# Patient Record
Sex: FEMALE | Race: WHITE | Employment: OTHER | URBAN - METROPOLITAN AREA
[De-identification: names, ages, dates, MRNs, and addresses within clinical notes are randomized per-mention and may not be internally consistent; named-entity substitution may affect disease eponyms.]

---

## 2019-12-02 ENCOUNTER — APPOINTMENT (OUTPATIENT)
Dept: GENERAL RADIOLOGY | Facility: CLINIC | Age: 74
End: 2019-12-02
Attending: EMERGENCY MEDICINE
Payer: COMMERCIAL

## 2019-12-02 ENCOUNTER — HOSPITAL ENCOUNTER (OUTPATIENT)
Facility: CLINIC | Age: 74
Setting detail: OBSERVATION
Discharge: HOME | End: 2019-12-03
Attending: EMERGENCY MEDICINE | Admitting: EMERGENCY MEDICINE
Payer: COMMERCIAL

## 2019-12-02 ENCOUNTER — APPOINTMENT (OUTPATIENT)
Dept: CT IMAGING | Facility: CLINIC | Age: 74
End: 2019-12-02
Attending: EMERGENCY MEDICINE
Payer: COMMERCIAL

## 2019-12-02 DIAGNOSIS — R55 SYNCOPE AND COLLAPSE: ICD-10-CM

## 2019-12-02 DIAGNOSIS — R00.1 BRADYCARDIA: ICD-10-CM

## 2019-12-02 DIAGNOSIS — E87.6 HYPOKALEMIA: ICD-10-CM

## 2019-12-02 DIAGNOSIS — R55 SYNCOPE, UNSPECIFIED SYNCOPE TYPE: ICD-10-CM

## 2019-12-02 LAB
ANION GAP SERPL CALCULATED.3IONS-SCNC: 11 MMOL/L (ref 3–14)
BASOPHILS # BLD AUTO: 0.1 10E9/L (ref 0–0.2)
BASOPHILS NFR BLD AUTO: 0.7 %
BUN SERPL-MCNC: 11 MG/DL (ref 7–30)
CALCIUM SERPL-MCNC: 8.5 MG/DL (ref 8.5–10.1)
CHLORIDE SERPL-SCNC: 98 MMOL/L (ref 94–109)
CO2 SERPL-SCNC: 23 MMOL/L (ref 20–32)
CREAT SERPL-MCNC: 1.06 MG/DL (ref 0.52–1.04)
DIFFERENTIAL METHOD BLD: NORMAL
EOSINOPHIL # BLD AUTO: 0.3 10E9/L (ref 0–0.7)
EOSINOPHIL NFR BLD AUTO: 3.6 %
ERYTHROCYTE [DISTWIDTH] IN BLOOD BY AUTOMATED COUNT: 12.8 % (ref 10–15)
GFR SERPL CREATININE-BSD FRML MDRD: 52 ML/MIN/{1.73_M2}
GLUCOSE SERPL-MCNC: 121 MG/DL (ref 70–99)
HCT VFR BLD AUTO: 38.6 % (ref 35–47)
HGB BLD-MCNC: 13.1 G/DL (ref 11.7–15.7)
IMM GRANULOCYTES # BLD: 0 10E9/L (ref 0–0.4)
IMM GRANULOCYTES NFR BLD: 0.3 %
LYMPHOCYTES # BLD AUTO: 3.4 10E9/L (ref 0.8–5.3)
LYMPHOCYTES NFR BLD AUTO: 44.5 %
MCH RBC QN AUTO: 31.1 PG (ref 26.5–33)
MCHC RBC AUTO-ENTMCNC: 33.9 G/DL (ref 31.5–36.5)
MCV RBC AUTO: 92 FL (ref 78–100)
MONOCYTES # BLD AUTO: 0.6 10E9/L (ref 0–1.3)
MONOCYTES NFR BLD AUTO: 7.6 %
NEUTROPHILS # BLD AUTO: 3.3 10E9/L (ref 1.6–8.3)
NEUTROPHILS NFR BLD AUTO: 43.3 %
NRBC # BLD AUTO: 0 10*3/UL
NRBC BLD AUTO-RTO: 0 /100
NT-PROBNP SERPL-MCNC: 80 PG/ML (ref 0–900)
PLATELET # BLD AUTO: 224 10E9/L (ref 150–450)
POTASSIUM SERPL-SCNC: 2.8 MMOL/L (ref 3.4–5.3)
RBC # BLD AUTO: 4.21 10E12/L (ref 3.8–5.2)
SODIUM SERPL-SCNC: 132 MMOL/L (ref 133–144)
TROPONIN I BLD-MCNC: 0 UG/L (ref 0–0.08)
TROPONIN I SERPL-MCNC: <0.015 UG/L (ref 0–0.04)
WBC # BLD AUTO: 7.6 10E9/L (ref 4–11)

## 2019-12-02 PROCEDURE — 93010 ELECTROCARDIOGRAM REPORT: CPT | Mod: Z6 | Performed by: EMERGENCY MEDICINE

## 2019-12-02 PROCEDURE — 85025 COMPLETE CBC W/AUTO DIFF WBC: CPT | Performed by: EMERGENCY MEDICINE

## 2019-12-02 PROCEDURE — 93010 ELECTROCARDIOGRAM REPORT: CPT | Mod: 76 | Performed by: EMERGENCY MEDICINE

## 2019-12-02 PROCEDURE — 99285 EMERGENCY DEPT VISIT HI MDM: CPT | Mod: 25 | Performed by: EMERGENCY MEDICINE

## 2019-12-02 PROCEDURE — 83880 ASSAY OF NATRIURETIC PEPTIDE: CPT | Performed by: EMERGENCY MEDICINE

## 2019-12-02 PROCEDURE — 96374 THER/PROPH/DIAG INJ IV PUSH: CPT | Performed by: EMERGENCY MEDICINE

## 2019-12-02 PROCEDURE — 80320 DRUG SCREEN QUANTALCOHOLS: CPT | Performed by: EMERGENCY MEDICINE

## 2019-12-02 PROCEDURE — 71045 X-RAY EXAM CHEST 1 VIEW: CPT

## 2019-12-02 PROCEDURE — 93005 ELECTROCARDIOGRAM TRACING: CPT | Performed by: EMERGENCY MEDICINE

## 2019-12-02 PROCEDURE — 25000128 H RX IP 250 OP 636: Performed by: EMERGENCY MEDICINE

## 2019-12-02 PROCEDURE — 25800030 ZZH RX IP 258 OP 636: Performed by: EMERGENCY MEDICINE

## 2019-12-02 PROCEDURE — 84484 ASSAY OF TROPONIN QUANT: CPT | Performed by: EMERGENCY MEDICINE

## 2019-12-02 PROCEDURE — 93005 ELECTROCARDIOGRAM TRACING: CPT | Mod: 76 | Performed by: EMERGENCY MEDICINE

## 2019-12-02 PROCEDURE — 0298T ZZC EXT ECG > 48HR TO 21 DAY REVIEW AND INTERPRETATN: CPT | Performed by: INTERNAL MEDICINE

## 2019-12-02 PROCEDURE — 70450 CT HEAD/BRAIN W/O DYE: CPT

## 2019-12-02 PROCEDURE — 80048 BASIC METABOLIC PNL TOTAL CA: CPT | Performed by: EMERGENCY MEDICINE

## 2019-12-02 PROCEDURE — 25000132 ZZH RX MED GY IP 250 OP 250 PS 637: Performed by: EMERGENCY MEDICINE

## 2019-12-02 PROCEDURE — 84484 ASSAY OF TROPONIN QUANT: CPT

## 2019-12-02 PROCEDURE — 96361 HYDRATE IV INFUSION ADD-ON: CPT | Performed by: EMERGENCY MEDICINE

## 2019-12-02 RX ORDER — ATORVASTATIN CALCIUM 40 MG/1
40 TABLET, FILM COATED ORAL DAILY
COMMUNITY

## 2019-12-02 RX ORDER — ATENOLOL 50 MG/1
50 TABLET ORAL DAILY
COMMUNITY
End: 2019-12-03

## 2019-12-02 RX ORDER — LEVOTHYROXINE SODIUM 100 UG/1
100 TABLET ORAL DAILY
Status: ON HOLD | COMMUNITY
End: 2019-12-03

## 2019-12-02 RX ORDER — LISINOPRIL AND HYDROCHLOROTHIAZIDE 12.5; 2 MG/1; MG/1
1 TABLET ORAL DAILY
COMMUNITY

## 2019-12-02 RX ORDER — ONDANSETRON 2 MG/ML
4 INJECTION INTRAMUSCULAR; INTRAVENOUS ONCE
Status: COMPLETED | OUTPATIENT
Start: 2019-12-02 | End: 2019-12-02

## 2019-12-02 RX ORDER — AMLODIPINE BESYLATE 5 MG/1
5 TABLET ORAL DAILY
COMMUNITY

## 2019-12-02 RX ORDER — ASPIRIN 81 MG/1
81 TABLET ORAL DAILY
COMMUNITY

## 2019-12-02 RX ORDER — POTASSIUM CHLORIDE 750 MG/1
40 TABLET, EXTENDED RELEASE ORAL ONCE
Status: COMPLETED | OUTPATIENT
Start: 2019-12-02 | End: 2019-12-02

## 2019-12-02 RX ADMIN — POTASSIUM CHLORIDE 40 MEQ: 750 TABLET, EXTENDED RELEASE ORAL at 21:54

## 2019-12-02 RX ADMIN — SODIUM CHLORIDE 1000 ML: 900 INJECTION, SOLUTION INTRAVENOUS at 21:00

## 2019-12-02 RX ADMIN — ONDANSETRON 4 MG: 2 INJECTION INTRAMUSCULAR; INTRAVENOUS at 20:49

## 2019-12-02 ASSESSMENT — ENCOUNTER SYMPTOMS
NAUSEA: 1
FEVER: 0
VOMITING: 0
BACK PAIN: 0
HEADACHES: 0
NECK PAIN: 0

## 2019-12-02 ASSESSMENT — MIFFLIN-ST. JEOR: SCORE: 1199.44

## 2019-12-03 ENCOUNTER — APPOINTMENT (OUTPATIENT)
Dept: CARDIOLOGY | Facility: CLINIC | Age: 74
End: 2019-12-03
Attending: NURSE PRACTITIONER
Payer: COMMERCIAL

## 2019-12-03 VITALS
DIASTOLIC BLOOD PRESSURE: 78 MMHG | RESPIRATION RATE: 16 BRPM | HEART RATE: 67 BPM | OXYGEN SATURATION: 98 % | WEIGHT: 155.3 LBS | TEMPERATURE: 98.3 F | SYSTOLIC BLOOD PRESSURE: 145 MMHG | BODY MASS INDEX: 26.51 KG/M2 | HEIGHT: 64 IN

## 2019-12-03 PROBLEM — R55 SYNCOPE: Status: ACTIVE | Noted: 2019-12-03

## 2019-12-03 LAB
ALBUMIN SERPL-MCNC: 3.3 G/DL (ref 3.4–5)
ALP SERPL-CCNC: 83 U/L (ref 40–150)
ALT SERPL W P-5'-P-CCNC: 16 U/L (ref 0–50)
ANION GAP SERPL CALCULATED.3IONS-SCNC: 5 MMOL/L (ref 3–14)
AST SERPL W P-5'-P-CCNC: 19 U/L (ref 0–45)
BILIRUB SERPL-MCNC: 0.8 MG/DL (ref 0.2–1.3)
BUN SERPL-MCNC: 9 MG/DL (ref 7–30)
CALCIUM SERPL-MCNC: 8.3 MG/DL (ref 8.5–10.1)
CHLORIDE SERPL-SCNC: 103 MMOL/L (ref 94–109)
CO2 SERPL-SCNC: 25 MMOL/L (ref 20–32)
CREAT SERPL-MCNC: 0.82 MG/DL (ref 0.52–1.04)
ERYTHROCYTE [DISTWIDTH] IN BLOOD BY AUTOMATED COUNT: 12.7 % (ref 10–15)
ETHANOL SERPL-MCNC: 0.02 G/DL
GFR SERPL CREATININE-BSD FRML MDRD: 71 ML/MIN/{1.73_M2}
GLUCOSE SERPL-MCNC: 89 MG/DL (ref 70–99)
HCT VFR BLD AUTO: 36.4 % (ref 35–47)
HGB BLD-MCNC: 12 G/DL (ref 11.7–15.7)
INTERPRETATION ECG - MUSE: NORMAL
MAGNESIUM SERPL-MCNC: 1.7 MG/DL (ref 1.6–2.3)
MCH RBC QN AUTO: 30.5 PG (ref 26.5–33)
MCHC RBC AUTO-ENTMCNC: 33 G/DL (ref 31.5–36.5)
MCV RBC AUTO: 92 FL (ref 78–100)
PLATELET # BLD AUTO: 194 10E9/L (ref 150–450)
POTASSIUM SERPL-SCNC: 4.2 MMOL/L (ref 3.4–5.3)
PROT SERPL-MCNC: 5.9 G/DL (ref 6.8–8.8)
RBC # BLD AUTO: 3.94 10E12/L (ref 3.8–5.2)
SODIUM SERPL-SCNC: 134 MMOL/L (ref 133–144)
TROPONIN I SERPL-MCNC: <0.015 UG/L (ref 0–0.04)
TROPONIN I SERPL-MCNC: <0.015 UG/L (ref 0–0.04)
WBC # BLD AUTO: 6.2 10E9/L (ref 4–11)

## 2019-12-03 PROCEDURE — 83735 ASSAY OF MAGNESIUM: CPT | Performed by: NURSE PRACTITIONER

## 2019-12-03 PROCEDURE — 80053 COMPREHEN METABOLIC PANEL: CPT | Performed by: NURSE PRACTITIONER

## 2019-12-03 PROCEDURE — 93005 ELECTROCARDIOGRAM TRACING: CPT

## 2019-12-03 PROCEDURE — 93306 TTE W/DOPPLER COMPLETE: CPT | Mod: 26 | Performed by: INTERNAL MEDICINE

## 2019-12-03 PROCEDURE — 25800030 ZZH RX IP 258 OP 636: Performed by: NURSE PRACTITIONER

## 2019-12-03 PROCEDURE — 93010 ELECTROCARDIOGRAM REPORT: CPT | Performed by: INTERNAL MEDICINE

## 2019-12-03 PROCEDURE — 40000264 ECHOCARDIOGRAM COMPLETE

## 2019-12-03 PROCEDURE — 96361 HYDRATE IV INFUSION ADD-ON: CPT

## 2019-12-03 PROCEDURE — 99235 HOSP IP/OBS SAME DATE MOD 70: CPT | Mod: Z6 | Performed by: EMERGENCY MEDICINE

## 2019-12-03 PROCEDURE — 84484 ASSAY OF TROPONIN QUANT: CPT | Performed by: NURSE PRACTITIONER

## 2019-12-03 PROCEDURE — 25000132 ZZH RX MED GY IP 250 OP 250 PS 637: Performed by: NURSE PRACTITIONER

## 2019-12-03 PROCEDURE — G0378 HOSPITAL OBSERVATION PER HR: HCPCS

## 2019-12-03 PROCEDURE — 85027 COMPLETE CBC AUTOMATED: CPT | Performed by: NURSE PRACTITIONER

## 2019-12-03 PROCEDURE — 36415 COLL VENOUS BLD VENIPUNCTURE: CPT | Performed by: NURSE PRACTITIONER

## 2019-12-03 RX ORDER — ONDANSETRON 4 MG/1
4 TABLET, ORALLY DISINTEGRATING ORAL EVERY 6 HOURS PRN
Status: DISCONTINUED | OUTPATIENT
Start: 2019-12-03 | End: 2019-12-03 | Stop reason: HOSPADM

## 2019-12-03 RX ORDER — POTASSIUM CL/LIDO/0.9 % NACL 10MEQ/0.1L
10 INTRAVENOUS SOLUTION, PIGGYBACK (ML) INTRAVENOUS
Status: DISCONTINUED | OUTPATIENT
Start: 2019-12-03 | End: 2019-12-03 | Stop reason: HOSPADM

## 2019-12-03 RX ORDER — LEVOTHYROXINE SODIUM 112 UG/1
112 TABLET ORAL DAILY
COMMUNITY

## 2019-12-03 RX ORDER — LEVOTHYROXINE SODIUM 112 UG/1
112 TABLET ORAL
Status: DISCONTINUED | OUTPATIENT
Start: 2019-12-03 | End: 2019-12-03 | Stop reason: HOSPADM

## 2019-12-03 RX ORDER — ONDANSETRON 2 MG/ML
4 INJECTION INTRAMUSCULAR; INTRAVENOUS EVERY 6 HOURS PRN
Status: DISCONTINUED | OUTPATIENT
Start: 2019-12-03 | End: 2019-12-03 | Stop reason: HOSPADM

## 2019-12-03 RX ORDER — POTASSIUM CHLORIDE 1.5 G/1.58G
20-40 POWDER, FOR SOLUTION ORAL
Status: DISCONTINUED | OUTPATIENT
Start: 2019-12-03 | End: 2019-12-03 | Stop reason: HOSPADM

## 2019-12-03 RX ORDER — ASPIRIN 81 MG/1
81 TABLET ORAL DAILY
Status: DISCONTINUED | OUTPATIENT
Start: 2019-12-03 | End: 2019-12-03 | Stop reason: HOSPADM

## 2019-12-03 RX ORDER — SODIUM CHLORIDE 9 MG/ML
INJECTION, SOLUTION INTRAVENOUS CONTINUOUS
Status: DISCONTINUED | OUTPATIENT
Start: 2019-12-03 | End: 2019-12-03 | Stop reason: HOSPADM

## 2019-12-03 RX ORDER — LISINOPRIL AND HYDROCHLOROTHIAZIDE 12.5; 2 MG/1; MG/1
1 TABLET ORAL DAILY
Status: DISCONTINUED | OUTPATIENT
Start: 2019-12-03 | End: 2019-12-03 | Stop reason: HOSPADM

## 2019-12-03 RX ORDER — POTASSIUM CHLORIDE 750 MG/1
40 TABLET, EXTENDED RELEASE ORAL ONCE
Status: COMPLETED | OUTPATIENT
Start: 2019-12-03 | End: 2019-12-03

## 2019-12-03 RX ORDER — LIDOCAINE 40 MG/G
CREAM TOPICAL
Status: DISCONTINUED | OUTPATIENT
Start: 2019-12-03 | End: 2019-12-03 | Stop reason: HOSPADM

## 2019-12-03 RX ORDER — NITROGLYCERIN 0.4 MG/1
0.4 TABLET SUBLINGUAL EVERY 5 MIN PRN
Status: DISCONTINUED | OUTPATIENT
Start: 2019-12-03 | End: 2019-12-03 | Stop reason: HOSPADM

## 2019-12-03 RX ORDER — ACETAMINOPHEN 650 MG/1
650 SUPPOSITORY RECTAL EVERY 4 HOURS PRN
Status: DISCONTINUED | OUTPATIENT
Start: 2019-12-03 | End: 2019-12-03 | Stop reason: HOSPADM

## 2019-12-03 RX ORDER — ACETAMINOPHEN 325 MG/1
650 TABLET ORAL EVERY 4 HOURS PRN
Status: DISCONTINUED | OUTPATIENT
Start: 2019-12-03 | End: 2019-12-03 | Stop reason: HOSPADM

## 2019-12-03 RX ORDER — POTASSIUM CHLORIDE 29.8 MG/ML
20 INJECTION INTRAVENOUS
Status: DISCONTINUED | OUTPATIENT
Start: 2019-12-03 | End: 2019-12-03 | Stop reason: HOSPADM

## 2019-12-03 RX ORDER — ATORVASTATIN CALCIUM 40 MG/1
40 TABLET, FILM COATED ORAL DAILY
Status: DISCONTINUED | OUTPATIENT
Start: 2019-12-03 | End: 2019-12-03 | Stop reason: HOSPADM

## 2019-12-03 RX ORDER — AMLODIPINE BESYLATE 5 MG/1
5 TABLET ORAL EVERY EVENING
Status: DISCONTINUED | OUTPATIENT
Start: 2019-12-03 | End: 2019-12-03 | Stop reason: HOSPADM

## 2019-12-03 RX ORDER — POTASSIUM CHLORIDE 750 MG/1
20-40 TABLET, EXTENDED RELEASE ORAL
Status: DISCONTINUED | OUTPATIENT
Start: 2019-12-03 | End: 2019-12-03 | Stop reason: HOSPADM

## 2019-12-03 RX ORDER — POTASSIUM CHLORIDE 7.45 MG/ML
10 INJECTION INTRAVENOUS
Status: DISCONTINUED | OUTPATIENT
Start: 2019-12-03 | End: 2019-12-03 | Stop reason: HOSPADM

## 2019-12-03 RX ADMIN — POTASSIUM CHLORIDE 40 MEQ: 750 TABLET, EXTENDED RELEASE ORAL at 01:10

## 2019-12-03 RX ADMIN — LISINOPRIL AND HYDROCHLOROTHIAZIDE 1 TABLET: 20; 12.5 TABLET ORAL at 07:46

## 2019-12-03 RX ADMIN — ASPIRIN 81 MG: 81 TABLET, COATED ORAL at 07:46

## 2019-12-03 RX ADMIN — ATORVASTATIN CALCIUM 40 MG: 40 TABLET, FILM COATED ORAL at 07:46

## 2019-12-03 RX ADMIN — LEVOTHYROXINE SODIUM 112 MCG: 112 TABLET ORAL at 07:46

## 2019-12-03 RX ADMIN — SODIUM CHLORIDE: 9 INJECTION, SOLUTION INTRAVENOUS at 01:34

## 2019-12-03 RX ADMIN — SODIUM CHLORIDE: 9 INJECTION, SOLUTION INTRAVENOUS at 09:16

## 2019-12-03 NOTE — PLAN OF CARE
Outpatient/Observation goals to be met before discharge home:  - Diagnostic tests and consults completed and resulted- Not Met  - No further episodes of syncope and any new arrhythmia addressed with controlled heart rate-Met   - Vital signs normal or at patient baseline and orthostatic vitals are normal and patient not lightheaded with standing-Met  - Tolerating oral intake to maintain hydration- Met  - Safe disposition plan has been identified-Not Met     *Nurse to notify provider when observation goals have been met and patient is ready for discharge.

## 2019-12-03 NOTE — H&P
[facility], [place]    History and Physical - ED Observation        Date of Admission:  12/2/2019    Assessment & Plan   Joycelyn Chan is a 74 year old female with a history of HTN and HPL who presents to the Emergency Department today for evaluation following a syncopal episode.    1. Syncope: The pt had several alcoholic beverages at dinner. She states she started to feel unwell and got up to go outside to get some air. While on her way outside, pt had syncopal episode and fell to the ground. It's unclear from witnesses whether pt struck her head. Per EMS, pt was hypotensive to 70 systolic with no heart rate. Pt denies neck pain, back pain, or headache. Her only complaint is nausea. She denies fever, chest pain, or shortness of breath. In the ED, /75, HR 59, temp 97.4, RR 12, SPO2 98%. Labs show: Na 132, K+ 2.8, Cr 1.06, BUN 11. BNP 80. Troponin negative. Glucose 121. WBC 7.6, Hgb 13.1, hematocrit 38.6. Chest xray shows Blunting of left costophrenic angle. Focal atelectasis left base. Right lung clear. Heart size normal. CT Head negative for bleed or other acute process. Cardiology fellow, Saumya, was contacted regarding EKG abnormalities and confirm that EKG does not show STEMI or abnormalities in CT interval. Medications: 1000ml IVF bolus, Zofran 4mg IV. Plan: Admit to ED Observation under Syncope protocol.   -[place] to ED Obs  -Continuous cardiac monitoring  -Serial troponins  -Ethanol level pending  -Echocardiogram pending  -IVF  -Orthostatics pending  -CBC, CMP in AM    2. Hypokalemia: K+2.8  -Replace with another 40mEq potassium tonight  -CMP in AM    Chronic Medical Problems  #Hypothyroid: continue PTA levothyroxine 112 mcg   #HTN: continue PTA amlodipine, continue PTA ASA 81 mg, continue PTA lisinopril-hydrochlorothiazide  #HLD: continue PTA atorvastatin  #GERD: continue PTA prilosec 20mg daily     Diet: regular, caffeine free  DVT Prophylaxis: Low Risk/Ambulatory with  "no VTE prophylaxis indicated  Alvarez Catheter: not present  Code Status: full    Disposition Plan   Expected discharge: Tomorrow, recommended to prior living arrangement once syncope workup complete.  Entered: Ania Mcmahan CNP 12/03/2019, 12:37 AM     The patient's care was discussed with the Attending Physician, Dr. Vincent.    Ania Mcmahan CNP  [facility], [place]  ED Observation, 6D  Ascom:[id number]  ______________________________________________________________________    Chief Complaint   Loss of consciousness, fall    History is obtained from the patient    History of Present Illness   Per ED,\"Joycelyn Chan is a 74 year old female with a history of HTN and HPL who presents to the Emergency Department today for evaluation following a syncopal episode. The patient was at [facility] where she had a few alcoholic beverages. She states she started to feel unwell and got up to go outside to get some air. While on her way outside the patient had a syncopal episode and fell to the ground. It is unclear from witnesses whether or not the patient struck her head in the process of the fall. Upon arrival of EMS to the scene, the patient had a systolic blood pressure of 70 and they were unable to read a heart rate. Since the fall, the patient has denies neck pain, back pain, or headache. Her only complaint is nausea. She denies fever, chest pain, or shortness of breath. The patient reports that she has never had a syncopal episode in the past. The patient does have a family history of heart issues. \"    Review of Systems    Constitutional: Negative for fever.   Gastrointestinal: Positive for nausea. Negative for vomiting.   Musculoskeletal: Negative for back pain and neck pain.   Neurological: Positive for syncope. Negative for headaches.   All other systems reviewed and are negative.    Past Medical History    I have reviewed this patient's medical history and updated it with pertinent " information if needed.   Past Medical History:   Diagnosis Date     GERD (gastroesophageal reflux disease)      Hypercholesteremia      Hypertension      Thyroid disease        Past Surgical History   I have reviewed this patient's surgical history and updated it with pertinent information if needed.  Past Surgical History:   Procedure Laterality Date     ARTHROSCOPY SHOULDER ROTATOR CUFF REPAIR Left      breast reduction       KNEE SURGERY Right     acl repair       Social History   I have reviewed this patient's social history and updated it with pertinent information if needed.  Social History     Tobacco Use     Smoking status: Former Smoker     Smokeless tobacco: Never Used   Substance Use Topics     Alcohol use: Yes     Drug use: Not Currently       Family History   I have reviewed this patient's family history and updated it with pertinent information if needed.   No family history on file.    Prior to Admission Medications   Prior to Admission Medications   Prescriptions Last Dose Informant Patient Reported? Taking?   VITAMIN D PO   Yes Yes   Sig: Take 1,000 mg by mouth   amLODIPine (NORVASC) 5 MG tablet   Yes Yes   Sig: Take 5 mg by mouth daily   aspirin 81 MG EC tablet   Yes Yes   Sig: Take 81 mg by mouth daily   atorvastatin (LIPITOR) 40 MG tablet   Yes Yes   Sig: Take 40 mg by mouth daily   levothyroxine (SYNTHROID/LEVOTHROID) 100 MCG tablet   Yes Yes   Sig: Take 100 mcg by mouth daily   lisinopril-hydrochlorothiazide (PRINZIDE/ZESTORETIC) 20-12.5 MG tablet   Yes Yes   Sig: Take 1 tablet by mouth daily      Facility-Administered Medications: None     Allergies   No Known Allergies    Physical Exam   Vital Signs: Temp: 97.4  F (36.3  C) Temp src: Oral BP: 130/75 Pulse: 67 Heart Rate: 60 Resp: 17 SpO2: 96 % O2 Device: None (Room air)    Weight: 155 lbs 4.8 oz    Constitutional: awake, alert, cooperative, no apparent distress, and appears stated age  Eyes: Lids and lashes normal, pupils equal, round and  reactive to light, extra ocular muscles intact, sclera clear, conjunctiva normal  ENT: Normocephalic, atraumatic, external ears without lesions, oral pharynx with moist mucous membranes, gums normal and good dentition.  Respiratory: Clear to auscultation bilaterally, no crackles or wheezing  Cardiovascular: Regular rate and rhythm, normal S1 and S2, and no murmur noted  Abdomen: Normal bowel sounds, soft, non-distended, non-tender  Skin: Normal skin color, texture, turgor  Musculoskeletal: There is no redness, warmth, or swelling of the joints.  Full range of motion noted.  Motor strength is 5 out of 5 all extremities bilaterally.  Tone is normal.  Neurologic: Awake, alert, oriented to name, place and time.  Cranial nerves II-XII are grossly intact.  Motor is 5 out of 5 bilaterally.  Cerebellar finger to nose, heel to shin intact.  Gait is normal.    Data   Data reviewed today: I reviewed all medications, new labs and imaging results over the last 24 hours. I personally reviewed the CT and xray image(s) showing :    EKG  Interpreted by Jabier Marin MD  Time reviewed: 2041  Symptoms at time of EKG: syncope  Rhythm: normal sinus   Rate: 59  Axis: normal  Ectopy: none  Conduction: normal  ST Segments/ T Waves: t wave inversions anteriorly V2 V3  Q Waves: none  Comparison to prior: No old EKG available     Clinical Impression: Sinus bradycardia with anterior t wave inversions    EKG II  Interpreted by Jabier Marin MD  Time reviewed:2117  Symptoms at time of EKG: syncope  Rhythm: sinus bradycardia  Rate: 56  Axis: NORMAL  Ectopy: none  Conduction: normal  ST Segments/ T Waves: anterior t wave inversions V2 V3  Q Waves: none  Comparison to prior: unchanged     Clinical Impression: NSR with anterior t wave inversions    Recent Labs   Lab 12/02/19 2037 12/02/19 2035   WBC 7.6  --    HGB 13.1  --    MCV 92  --      --    *  --    POTASSIUM 2.8*  --    CHLORIDE 98  --    CO2 23  --    BUN 11   --    CR 1.06*  --    ANIONGAP 11  --    ANGELA 8.5  --    *  --    TROPI <0.015  --    TROPONIN  --  0.00     Recent Results (from the past 24 hour(s))   XR Chest Port 1 View    Narrative    EXAM: XR CHEST PORT 1 VW  LOCATION: [facility]  DATE/TIME: 12/2/2019 8:41 PM    INDICATION: Syncopal episode  COMPARISON: None.      Impression    IMPRESSION: Blunting of left costophrenic angle. Focal atelectasis left base. Right lung clear. Heart size normal   Head CT w/o contrast    Narrative    EXAM: CT HEAD W/O CONTRAST  LOCATION: [facility]  DATE/TIME: 12/2/2019 10:01 PM    INDICATION: Altered level of consciousness (LOC), unexplained  See the Clinical Information for Interpreting Provider  COMPARISON: None.  TECHNIQUE: Routine without IV contrast. Multiplanar reformats. Dose reduction techniques were used.    FINDINGS:  INTRACRANIAL CONTENTS: No intracranial hemorrhage, extraaxial collection, or mass effect.  No CT evidence of acute infarct. Normal parenchymal attenuation. Normal ventricles and sulci.     VISUALIZED ORBITS/SINUSES/MASTOIDS: No intraorbital abnormality. No paranasal sinus mucosal disease. No middle ear or mastoid effusion.    BONES/SOFT TISSUES: No acute abnormality.      Impression    IMPRESSION:  1.  Normal head CT.

## 2019-12-03 NOTE — PLAN OF CARE
Outpatient/Observation goals to be met before discharge home:      - Diagnostic tests and consults completed and resulted - Yes  - No further episodes of syncope and any new arrhythmia addressed with controlled heart rates - Yes  - Vital signs normal or at patient baseline and orthostatic vitals are normal and patient not lightheaded with standing - Yes  - Tolerating oral intake to maintain hydration - Yes  - Safe disposition plan has been identified - Yes

## 2019-12-03 NOTE — ED PROVIDER NOTES
History     Chief Complaint   Patient presents with     Loss of Consciousness     Fall     Nausea     HPI  Joycelyn Chan is a 74 year old female with a history of HTN and HPL who presents to the Emergency Department today for evaluation following a syncopal episode. The patient was at [facility] where she had a few alcoholic beverages. She states she started to feel unwell and got up to go outside to get some air. While on her way outside the patient had a syncopal episode and fell to the ground. It is unclear from witnesses whether or not the patient struck her head in the process of the fall. Upon arrival of EMS to the scene, the patient had a systolic blood pressure of 70 and they were unable to read a heart rate. Since the fall, the patient has denies neck pain, back pain, or headache. Her only complaint is nausea. She denies fever, chest pain, or shortness of breath. The patient reports that she has never had a syncopal episode in the past. The patient does have a family history of heart issues.     I have reviewed the Medications, Allergies, Past Medical and Surgical History, and Social History in the [employer] system.    Past Medical History:   Diagnosis Date     GERD (gastroesophageal reflux disease)      Hypercholesteremia      Hypertension      Thyroid disease      No past surgical history on file.    No family history on file.    Social History     Tobacco Use     Smoking status: Former Smoker     Smokeless tobacco: Never Used   Substance Use Topics     Alcohol use: Yes     Current Facility-Administered Medications   Medication     0.9% sodium chloride BOLUS     ondansetron (ZOFRAN) injection 4 mg     No current outpatient medications on file.      No Known Allergies     Review of Systems   Constitutional: Negative for fever.   Gastrointestinal: Positive for nausea. Negative for vomiting.   Musculoskeletal: Negative for back pain and neck pain.   Neurological: Positive for syncope. Negative for  headaches.   All other systems reviewed and are negative.    Physical Exam      Physical Exam  Vitals signs and nursing note reviewed.   Constitutional:       General: She is not in acute distress.     Appearance: She is not diaphoretic.   HENT:      Head: Normocephalic and atraumatic.   Eyes:      Conjunctiva/sclera: Conjunctivae normal.      Pupils: Pupils are equal, round, and reactive to light.   Neck:      Musculoskeletal: Normal range of motion and neck supple.   Cardiovascular:      Rate and Rhythm: Normal rate and regular rhythm.   Pulmonary:      Effort: Pulmonary effort is normal. No respiratory distress.   Abdominal:      General: There is no distension.      Palpations: Abdomen is soft.      Tenderness: There is no abdominal tenderness. There is no guarding.   Musculoskeletal: Normal range of motion.   Skin:     General: Skin is warm and dry.   Neurological:      Mental Status: She is alert and oriented to person, place, and time.   Psychiatric:         Behavior: Behavior normal.         Thought Content: Thought content normal.         ED Course   8:30 PM  The patient was seen and examined by Dr. Marin in Room 10.       Procedures             EKG Interpretation:      Interpreted by Jabier Marin MD  Time reviewed: 2041  Symptoms at time of EKG: syncope  Rhythm: normal sinus   Rate: 59  Axis: normal  Ectopy: none  Conduction: normal  ST Segments/ T Waves: t wave inversions anteriorly V2 V3  Q Waves: none  Comparison to prior: No old EKG available    Clinical Impression: Sinus bradycardia with anterior t wave inversions         EKG Interpretation:      Interpreted by Jabier Marin MD  Time reviewed:2117  Symptoms at time of EKG: syncope  Rhythm: sinus bradycardia  Rate: 56  Axis: NORMAL  Ectopy: none  Conduction: normal  ST Segments/ T Waves: anterior t wave inversions V2 V3  Q Waves: none  Comparison to prior: unchanged    Clinical Impression: NSR with anterior t wave  inversions          Critical Care time:  none             Labs Ordered and Resulted from Time of ED Arrival Up to the Time of Departure from the ED - No data to display         Assessments & Plan (with Medical Decision Making)   This is a 73 yo F who presents with a syncopal event while dining out.  She felt nauseated and hot prior to her syncope and states she did not eat much today.  EKG with Sinus Bradycardia, rate of 59 and anterior t wave inversions, which was unchanged with repeat EKG.  Troponin negative.  Potassium was 2.8 and Kdur 40mEq given.  Labs otherwise unremarkable.  Joycelyn was given a NS bolus and Zofran for nausea.  Likely vasovagal syncope.  Will admit to observation for serial troponins and an echo.  No evidence of ACS or other serious pathology.     I have reviewed the nursing notes.    I have reviewed the findings, diagnosis, plan and need for follow up with the patient.    New Prescriptions    No medications on file     Final diagnoses:   None   Vickey MASSEY, am serving as a trained medical scribe to document services personally performed by Jabier Marin MD, based on the provider's statements to me.   Jabier MASSEY MD, was physically present and have reviewed and verified the accuracy of this note documented by Vickey Grier.    12/2/2019   [facility], [place], EMERGENCY DEPARTMENT     Jabier Marin MD  12/02/19 1938

## 2019-12-03 NOTE — ED NOTES
Pt ambulatory to restroom with RN as standby pt tolerated well denies dizziness. Gait even and steady.

## 2019-12-03 NOTE — PROGRESS NOTES
IV's removed.  Discharge instructions given and questions answered.  Pt. Able to verify understanding.  Holter monitor placed.  Pt. Has all belongings.  Pt. Has all belongings.

## 2019-12-03 NOTE — DISCHARGE SUMMARY
[facility] [place]  Hospitalist Discharge Summary       Date of Admission:  12/2/2019  Date of Discharge:  12/3/2019  Discharging Provider: INES Reed CNP  Discharge Team: Emergency Department Observation Unit    Discharge Diagnoses   Syncope    Follow-ups Needed After Discharge   Follow-up Appointments     Adult [facility] Follow-up and recommended labs and tests      Follow up with your primary care doctor in one week for hospital follow   up and repeat potassium check.     Appointments on [place] and/or [facility] (with [facility] or [facility]   provider or service). Call [phone] if you haven't heard regarding   these appointments within 7 days of discharge.         Follow Up and recommended labs and tests      BMP         {Additional follow-up instructions/to-do's for PCP    :Hospital follow up    Unresulted Labs Ordered in the Past 30 Days of this Admission     No orders found for last 31 day(s).      These results will be followed up by PCP    Discharge Disposition   Discharged to home  Condition at discharge: Stable    Hospital Course   Joycelyn Chan is a 74 year old female with a history of HTN and HPL who presents to the Emergency Department today for evaluation following a syncopal episode.     1. Syncope: The pt had several alcoholic beverages at dinner. She states she started to feel unwell and got up to go outside to get some air. While on her way outside, pt had syncopal episode and fell to the ground. It's unclear from witnesses whether pt struck her head. Per EMS, pt was hypotensive to 70 systolic with no heart rate. Pt denies neck pain, back pain, or headache. Troponin negative x 3. ETOH level 0.02.  Chest xray shows Blunting of left costophrenic angle. Focal atelectasis left base. Right lung clear. Heart size normal. CT Head negative for bleed or other acute process. EKG x 2 discussed with EP and did not see WPW. Agreed with holter monitor placement at  discharge and follow up with PCP.     2. Hypokalemia: K+2.8 repleted to 4.2. Patient is on Prinzide with only 12.5 mg of hydrochlorothiazide. Recommend repeat K within a week and per primary to make adjustments on medication based on potassium level.,        Chronic Medical Problems  #Hypothyroid: continue PTA levothyroxine 112 mcg   #HTN: continue PTA amlodipine, continue PTA ASA 81 mg, continue PTA lisinopril-hydrochlorothiazide  #HLD: continue PTA atorvastatin  #GERD: continue PTA prilosec     Consultations This Hospital Stay   None    Code Status   Full Code    Time Spent on this Encounter   I, INES Reed CNP, personally saw the patient today and spent less than or equal to 30 minutes discharging this patient.       INES Reed CNP  [facility] [place]  ______________________________________________________________________    Physical Exam   Vital Signs: Temp: 98.3  F (36.8  C) Temp src: Oral BP: (!) 145/78 Pulse: 67 Heart Rate: 70 Resp: 16 SpO2: 98 % O2 Device: None (Room air)    Weight: 155 lbs 4.8 oz  Constitutional: awake, alert, cooperative, no apparent distress, and appears stated age  Eyes: Lids and lashes normal, pupils equal, round and reactive to light, extra ocular muscles intact, sclera clear, conjunctiva normal  ENT: Normocephalic, atraumatic, external ears without lesions, oral pharynx with moist mucous membranes, gums normal and good dentition.  Respiratory: Clear to auscultation bilaterally, no crackles or wheezing  Cardiovascular: Regular rate and rhythm, normal S1 and S2, and no murmur noted  Abdomen: Normal bowel sounds, soft, non-distended, non-tender  Skin: Normal skin color, texture, turgor  Musculoskeletal: There is no redness, warmth, or swelling of the joints.  Full range of motion noted.  Motor strength is 5 out of 5 all extremities bilaterally.  Tone is normal.  Neurologic: Awake, alert, oriented to name, place and time.  Cranial nerves  II-XII are grossly intact.  Motor is 5 out of 5 bilaterally.  Cerebellar finger to nose,       Primary Care Physician   Jayleen Barlow    Discharge Orders      Basic metabolic panel     Reason for your hospital stay    Syncopal episode     Adult [facility] Follow-up and recommended labs and tests    Follow up with your primary care doctor in one week for hospital follow up and repeat potassium check.     Appointments on [place] and/or [facility] (with [facility] or [facility] provider or service). Call [phone] if you haven't heard regarding these appointments within 7 days of discharge.     Follow Up and recommended labs and tests    BMP     Activity    Your activity upon discharge: activity as tolerated     When to contact your care team    Call [phone] if any of these occur:    Another fainting spell     Pain in your chest, arm, neck, jaw, back, or abdomen    Shortness of breath    Severe headache or seizure    Blood in vomit or stools (black or red color)    Unexpected vaginal bleeding    Your heart beats very rapidly, very slowly, or irregularly (palpitations)    Weakness in an arm or leg or on one side of the face    Difficulty speaking or seeing    Extreme drowsiness, confusion, dizziness, or fainting     Discharge Instructions    You were admitted to the ED observation unit at the [facility] for evaluation following a syncopal episode. Your blood pressure was noted to be low in the ambulance and improved with IV fluids. Troponins (a lab test to check if there were damage to the heart tissue) were checked and these were negative. Your chest xray was negative for infection. CT of your head was negative for stroke. Your potassium was noted to be low and was replaced. Recommend follow up with your primary care doctor in one week for hospital follow up and recheck your potassium. A holter monitor or portable heart monitor was placed on discharge. This is to monitor your heart rhythm during your everyday activity for the  next 72 hours. These results can be reviewed by your primary care doctor.           Diet    Follow this diet upon discharge: Orders Placed This Encounter  Regular diet       Significant Results and Procedures   Results for orders placed or performed during the hospital encounter of 19   XR Chest Port 1 View    Narrative    EXAM: XR CHEST PORT 1 VW  LOCATION: [facility]  DATE/TIME: 2019 8:41 PM    INDICATION: Syncopal episode  COMPARISON: None.      Impression    IMPRESSION: Blunting of left costophrenic angle. Focal atelectasis left base. Right lung clear. Heart size normal   Head CT w/o contrast    Narrative    EXAM: CT HEAD W/O CONTRAST  LOCATION: [facility]  DATE/TIME: 2019 10:01 PM    INDICATION: Altered level of consciousness (LOC), unexplained  See the Clinical Information for Interpreting Provider  COMPARISON: None.  TECHNIQUE: Routine without IV contrast. Multiplanar reformats. Dose reduction techniques were used.    FINDINGS:  INTRACRANIAL CONTENTS: No intracranial hemorrhage, extraaxial collection, or mass effect.  No CT evidence of acute infarct. Normal parenchymal attenuation. Normal ventricles and sulci.     VISUALIZED ORBITS/SINUSES/MASTOIDS: No intraorbital abnormality. No paranasal sinus mucosal disease. No middle ear or mastoid effusion.    BONES/SOFT TISSUES: No acute abnormality.      Impression    IMPRESSION:  1.  Normal head CT.   Echocardiogram Complete    Narrative    [id number]  [id number]  [id number]  [id number]^MICHAEL^YANELIS^CURT           [facility],[place]  Echocardiography Laboratory  [address] [place] [id number]     Name: ALESSANDRO RAMEY  MRN: [id number]  : 1945  Study Date: 2019 09:38 AM  Age: 74 yrs  Gender: Female  Patient Location: [facility]  Reason For Study: Syncope  Ordering Physician: YANELIS RODRIGUEZ  Referring Physician: YANELIS RODRIGUEZ  Performed By: Luis Pa     BSA: 1.8 m2  Height: 64 in  Weight:  155 lb  HR: 54  BP: 134/64 mmHg  _____________________________________________________________________________  __        Procedure  Bubble Echocardiogram with two-dimensional, color and spectral Doppler  performed.  _____________________________________________________________________________  __        Interpretation Summary  Left ventricular function, chamber size, wall motion, and wall thickness are  normal.The EF is 60-65%.  Right ventricular function, chamber size, wall motion, and thickness are  normal.  The atrial septum is intact as assessed by agitated saline bubble study .  The inferior vena cava was normal in size with preserved respiratory  variability. No pericardial effusion is present.     There is no prior study for direct comparison.  _____________________________________________________________________________  __        Left Ventricle  Left ventricular function, chamber size, wall motion, and wall thickness are  normal.The EF is 60-65%. Grade I or early diastolic dysfunction. No regional  wall motion abnormalities are seen.     Right Ventricle  Right ventricular function, chamber size, wall motion, and thickness are  normal.     Atria  Both atria appear normal. The atrial septum is intact as assessed by agitated  saline bubble study .     Mitral Valve  Mild mitral annular calcification is present. Trace mitral insufficiency is  present.        Aortic Valve  Aortic valve is normal in structure and function. The aortic valve is  tricuspid. No aortic regurgitation is present.     Tricuspid Valve  The tricuspid valve is normal. Trace tricuspid insufficiency is present. The  right ventricular systolic pressure is approximated at 31.6 mmHg plus the  right atrial pressure.     Pulmonic Valve  The pulmonic valve is normal.     Vessels  The aorta root is normal. The inferior vena cava was normal in size with  preserved respiratory variability.     Pericardium  Prominent epicardial fat is noted. No  pericardial effusion is present.        Compared to Previous Study  There is no prior study for direct comparison.  _____________________________________________________________________________  __  MMode/2D Measurements & Calculations  IVSd: 0.85 cm     LVIDd: 4.5 cm  LVIDs: 2.7 cm  LVPWd: 0.77 cm  FS: 39.5 %  LV mass(C)d: 114.0 grams  LV mass(C)dI: 64.9 grams/m2  Ao root diam: 3.3 cm  asc Aorta Diam: 3.7 cm  LVOT diam: 1.9 cm  LVOT area: 2.8 cm2  LA Volume (BP): 61.6 ml  LA Volume Index (BP): 35.0 ml/m2  RWT: 0.35           Doppler Measurements & Calculations  MV E max sanjay: 72.2 cm/sec  MV A max sanjay: 123.0 cm/sec  MV E/A: 0.59  MV dec slope: 329.0 cm/sec2  MV dec time: 0.22 sec  Ao V2 max: 158.0 cm/sec  Ao max PG: 10.0 mmHg  Ao V2 mean: 106.0 cm/sec  Ao mean P.0 mmHg  Ao V2 VTI: 36.3 cm  ZEE(I,D): 2.2 cm2  ZEE(V,D): 2.1 cm2  LV V1 max P.5 mmHg  LV V1 max: 117.0 cm/sec  LV V1 VTI: 28.7 cm  SV(LVOT): 81.4 ml  SI(LVOT): 46.4 ml/m2  PA V2 max: 90.1 cm/sec  PA max PG: 3.2 mmHg  PA acc time: 0.13 sec  TR max sanjay: 281.0 cm/sec  TR max P.6 mmHg  AV Sanjay Ratio (DI): 0.74  ZEE Index (cm2/m2): 1.3  E/E' av.4  Lateral E/e': 7.4  Medial E/e': 11.4        _____________________________________________________________________________  __        Report approved by: KARLA Dasilva 2019 10:53 AM            Discharge Medications   Current Discharge Medication List      CONTINUE these medications which have NOT CHANGED    Details   aspirin 81 MG EC tablet Take 81 mg by mouth daily      atorvastatin (LIPITOR) 40 MG tablet Take 40 mg by mouth daily      levothyroxine (SYNTHROID/LEVOTHROID) 112 MCG tablet Take 112 mcg by mouth daily      lisinopril-hydrochlorothiazide (PRINZIDE/ZESTORETIC) 20-12.5 MG tablet Take 1 tablet by mouth daily      VITAMIN D PO Take 1,000 mg by mouth      amLODIPine (NORVASC) 5 MG tablet Take 5 mg by mouth daily           Allergies   No Known Allergies

## 2019-12-03 NOTE — PLAN OF CARE
Outpatient/Observation goals to be met before discharge home:     - Diagnostic tests and consults completed and resulted - No  - No further episodes of syncope and any new arrhythmia addressed with controlled heart rates - Yes  - Vital signs normal or at patient baseline and orthostatic vitals are normal and patient not lightheaded with standing - Yes  - Tolerating oral intake to maintain hydration - Yes  - Safe disposition plan has been identified - No

## 2019-12-03 NOTE — PROGRESS NOTES
"Patient interviewed and examined. Labs, imaging and chart notes reviewed.  Joycelyn Chan presented to the ED last night after a syncopal event. She was at a restaurant on campus watching a sports game. She has had some drinks over the course of the evening. She began to feel lightheaded, clammy and diaphoretic and felt she needed to go outside for some air. She was somewhat nauseated. She has no chest pain or palpitations. She went to a table to set down her purse, then fainted. No noted deizure activity. She sustained no injuries in the fall. She feels well this morning. EKG last night on arrival in the ED has short KS interval and probable KS depression. Potassium was low at 2.8. Troponin was normal. Creatinine was mildly elevated at 1.06. She had eaten poorly earlier in the day. She is on lisinopril/HCTZ for hypertension. No recent dose changes.    This morning, she feels well. She has mild headache. She has no neck pain. She has no back pain or pain elsewhere. She has no chest pain, palpitations or shortness of breath, She has no leg pain or swelling.    Past Medical History:   Diagnosis Date     GERD (gastroesophageal reflux disease)      Hypercholesteremia      Hypertension      Thyroid disease        Past Surgical History:   Procedure Laterality Date     ARTHROSCOPY SHOULDER ROTATOR CUFF REPAIR Left      breast reduction       KNEE SURGERY Right     acl repair       No family history on file.    Social History     Tobacco Use     Smoking status: Former Smoker     Smokeless tobacco: Never Used   Substance Use Topics     Alcohol use: Yes     Physical Exam:  Middle aged female in NAD.  BP (!) 145/78 (BP Location: Left arm)   Pulse 67   Temp 98.3  F (36.8  C) (Oral)   Resp 16   Ht 1.626 m (5' 4\")   Wt 70.4 kg (155 lb 4.8 oz)   SpO2 98%   BMI 26.66 kg/m    HEENT: PERRLA. EOMI.  Neck: Non tender. No bruit.  Lungs: Clear.  Cardiac: RRR. Normal S1 and S2. No JVD.  Abdomen: Soft, non tender.  Extrem: No " edema.  Neuro: Alert, oriented, speech normal. CN, motor, coordination intact.  Psych: Normal mood and affect.    Labs/Imaging    Results for orders placed or performed during the hospital encounter of 12/02/19 (from the past 24 hour(s))   Troponin POCT   Result Value Ref Range    Troponin I 0.00 0.00 - 0.08 ug/L   CBC with platelets differential   Result Value Ref Range    WBC 7.6 4.0 - 11.0 10e9/L    RBC Count 4.21 3.8 - 5.2 10e12/L    Hemoglobin 13.1 11.7 - 15.7 g/dL    Hematocrit 38.6 35.0 - 47.0 %    MCV 92 78 - 100 fl    MCH 31.1 26.5 - 33.0 pg    MCHC 33.9 31.5 - 36.5 g/dL    RDW 12.8 10.0 - 15.0 %    Platelet Count 224 150 - 450 10e9/L    Diff Method Automated Method     % Neutrophils 43.3 %    % Lymphocytes 44.5 %    % Monocytes 7.6 %    % Eosinophils 3.6 %    % Basophils 0.7 %    % Immature Granulocytes 0.3 %    Nucleated RBCs 0 0 /100    Absolute Neutrophil 3.3 1.6 - 8.3 10e9/L    Absolute Lymphocytes 3.4 0.8 - 5.3 10e9/L    Absolute Monocytes 0.6 0.0 - 1.3 10e9/L    Absolute Eosinophils 0.3 0.0 - 0.7 10e9/L    Absolute Basophils 0.1 0.0 - 0.2 10e9/L    Abs Immature Granulocytes 0.0 0 - 0.4 10e9/L    Absolute Nucleated RBC 0.0    Basic metabolic panel   Result Value Ref Range    Sodium 132 (L) 133 - 144 mmol/L    Potassium 2.8 (L) 3.4 - 5.3 mmol/L    Chloride 98 94 - 109 mmol/L    Carbon Dioxide 23 20 - 32 mmol/L    Anion Gap 11 3 - 14 mmol/L    Glucose 121 (H) 70 - 99 mg/dL    Urea Nitrogen 11 7 - 30 mg/dL    Creatinine 1.06 (H) 0.52 - 1.04 mg/dL    GFR Estimate 52 (L) >60 mL/min/[1.73_m2]    GFR Estimate If Black 61 >60 mL/min/[1.73_m2]    Calcium 8.5 8.5 - 10.1 mg/dL   Troponin I   Result Value Ref Range    Troponin I ES <0.015 0.000 - 0.045 ug/L   BNP   Result Value Ref Range    N-Terminal Pro BNP Inpatient 80 0 - 900 pg/mL   Alcohol   Result Value Ref Range    Ethanol g/dL 0.02 (H) <0.01 g/dL   XR Chest Port 1 View    Narrative    EXAM: XR CHEST PORT 1 VW  LOCATION: [facility]  Services  DATE/TIME: 12/2/2019 8:41 PM    INDICATION: Syncopal episode  COMPARISON: None.      Impression    IMPRESSION: Blunting of left costophrenic angle. Focal atelectasis left base. Right lung clear. Heart size normal   EKG 12 lead   Result Value Ref Range    Interpretation ECG Click View Image link to view waveform and result    Head CT w/o contrast    Narrative    EXAM: CT HEAD W/O CONTRAST  LOCATION: [facility]  DATE/TIME: 12/2/2019 10:01 PM    INDICATION: Altered level of consciousness (LOC), unexplained  See the Clinical Information for Interpreting Provider  COMPARISON: None.  TECHNIQUE: Routine without IV contrast. Multiplanar reformats. Dose reduction techniques were used.    FINDINGS:  INTRACRANIAL CONTENTS: No intracranial hemorrhage, extraaxial collection, or mass effect.  No CT evidence of acute infarct. Normal parenchymal attenuation. Normal ventricles and sulci.     VISUALIZED ORBITS/SINUSES/MASTOIDS: No intraorbital abnormality. No paranasal sinus mucosal disease. No middle ear or mastoid effusion.    BONES/SOFT TISSUES: No acute abnormality.      Impression    IMPRESSION:  1.  Normal head CT.   Troponin I   Result Value Ref Range    Troponin I ES <0.015 0.000 - 0.045 ug/L   CBC with platelets   Result Value Ref Range    WBC 6.2 4.0 - 11.0 10e9/L    RBC Count 3.94 3.8 - 5.2 10e12/L    Hemoglobin 12.0 11.7 - 15.7 g/dL    Hematocrit 36.4 35.0 - 47.0 %    MCV 92 78 - 100 fl    MCH 30.5 26.5 - 33.0 pg    MCHC 33.0 31.5 - 36.5 g/dL    RDW 12.7 10.0 - 15.0 %    Platelet Count 194 150 - 450 10e9/L   Comprehensive metabolic panel   Result Value Ref Range    Sodium 134 133 - 144 mmol/L    Potassium 4.2 3.4 - 5.3 mmol/L    Chloride 103 94 - 109 mmol/L    Carbon Dioxide 25 20 - 32 mmol/L    Anion Gap 5 3 - 14 mmol/L    Glucose 89 70 - 99 mg/dL    Urea Nitrogen 9 7 - 30 mg/dL    Creatinine 0.82 0.52 - 1.04 mg/dL    GFR Estimate 71 >60 mL/min/[1.73_m2]    GFR Estimate If Black 83 >60  mL/min/[1.73_m2]    Calcium 8.3 (L) 8.5 - 10.1 mg/dL    Bilirubin Total 0.8 0.2 - 1.3 mg/dL    Albumin 3.3 (L) 3.4 - 5.0 g/dL    Protein Total 5.9 (L) 6.8 - 8.8 g/dL    Alkaline Phosphatase 83 40 - 150 U/L    ALT 16 0 - 50 U/L    AST 19 0 - 45 U/L   Troponin I   Result Value Ref Range    Troponin I ES <0.015 0.000 - 0.045 ug/L   Magnesium   Result Value Ref Range    Magnesium 1.7 1.6 - 2.3 mg/dL   Echocardiogram Complete    Narrative    [id number]  [id number]  [id number]  [id number]^MICHAEL^YANELIS^CURT           [facility],[place]  Echocardiography Laboratory  [address] [place] [id number]     Name: ALESSANDRO RAMEY  MRN: [id number]  : 1945  Study Date: 2019 09:38 AM  Age: 74 yrs  Gender: Female  Patient Location: [facility]  Reason For Study: Syncope  Ordering Physician: YANELIS RODRIGUEZ  Referring Physician: YANELIS RODRIGUEZ  Performed By: Luis Pa     BSA: 1.8 m2  Height: 64 in  Weight: 155 lb  HR: 54  BP: 134/64 mmHg  _____________________________________________________________________________  __        Procedure  Bubble Echocardiogram with two-dimensional, color and spectral Doppler  performed.  _____________________________________________________________________________  __        Interpretation Summary  Left ventricular function, chamber size, wall motion, and wall thickness are  normal.The EF is 60-65%.  Right ventricular function, chamber size, wall motion, and thickness are  normal.  The atrial septum is intact as assessed by agitated saline bubble study .  The inferior vena cava was normal in size with preserved respiratory  variability. No pericardial effusion is present.     There is no prior study for direct comparison.  _____________________________________________________________________________  __        Left Ventricle  Left ventricular function, chamber size, wall motion, and wall thickness are  normal.The EF is 60-65%. Grade I or early diastolic dysfunction. No  regional  wall motion abnormalities are seen.     Right Ventricle  Right ventricular function, chamber size, wall motion, and thickness are  normal.     Atria  Both atria appear normal. The atrial septum is intact as assessed by agitated  saline bubble study .     Mitral Valve  Mild mitral annular calcification is present. Trace mitral insufficiency is  present.        Aortic Valve  Aortic valve is normal in structure and function. The aortic valve is  tricuspid. No aortic regurgitation is present.     Tricuspid Valve  The tricuspid valve is normal. Trace tricuspid insufficiency is present. The  right ventricular systolic pressure is approximated at 31.6 mmHg plus the  right atrial pressure.     Pulmonic Valve  The pulmonic valve is normal.     Vessels  The aorta root is normal. The inferior vena cava was normal in size with  preserved respiratory variability.     Pericardium  Prominent epicardial fat is noted. No pericardial effusion is present.        Compared to Previous Study  There is no prior study for direct comparison.  _____________________________________________________________________________  __  MMode/2D Measurements & Calculations  IVSd: 0.85 cm     LVIDd: 4.5 cm  LVIDs: 2.7 cm  LVPWd: 0.77 cm  FS: 39.5 %  LV mass(C)d: 114.0 grams  LV mass(C)dI: 64.9 grams/m2  Ao root diam: 3.3 cm  asc Aorta Diam: 3.7 cm  LVOT diam: 1.9 cm  LVOT area: 2.8 cm2  LA Volume (BP): 61.6 ml  LA Volume Index (BP): 35.0 ml/m2  RWT: 0.35           Doppler Measurements & Calculations  MV E max nicolás: 72.2 cm/sec  MV A max nicolás: 123.0 cm/sec  MV E/A: 0.59  MV dec slope: 329.0 cm/sec2  MV dec time: 0.22 sec  Ao V2 max: 158.0 cm/sec  Ao max PG: 10.0 mmHg  Ao V2 mean: 106.0 cm/sec  Ao mean P.0 mmHg  Ao V2 VTI: 36.3 cm  ZEE(I,D): 2.2 cm2  ZEE(V,D): 2.1 cm2  LV V1 max P.5 mmHg  LV V1 max: 117.0 cm/sec  LV V1 VTI: 28.7 cm  SV(LVOT): 81.4 ml  SI(LVOT): 46.4 ml/m2  PA V2 max: 90.1 cm/sec  PA max PG: 3.2 mmHg  PA acc time: 0.13  sec  TR max sanjay: 281.0 cm/sec  TR max P.6 mmHg  AV Sanjay Ratio (DI): 0.74  ZEE Index (cm2/m2): 1.3  E/E' av.4  Lateral E/e': 7.4  Medial E/e': 11.4        _____________________________________________________________________________  __        Report approved by: KARLA Dasilva 2019 10:53 AM      EKG 12-lead, tracing only   Result Value Ref Range    Interpretation ECG Click View Image link to view waveform and result           EKG Interpretation:      Interpreted by SUSANNE FLORENTINO MD  Time :2101   Symptoms at time of EKG: Syncope   Rhythm: Normal sinus   Rate: Normal  Axis: Normal  Ectopy: None  Conduction: Short AZ interval. Inverted P wave II, III,F. T wave inversion V2 V3. [place] upstroke R wave, consider delta wave.  ST Segments/ T Waves: T wave inversion V2 and V3  Q Waves: None  Comparison to prior: No old EKG available    Clinical Impression: Short AZ, non specific T wave changes. Consider WPW.         EKG Interpretation:      Interpreted by SUSANNE FLORENTINO MD  Time reviewed:12/3/19 11:11   Symptoms at time of EKG: None   Rhythm: Normal sinus  and Sinus bradycardia  Rate: 50-60  Axis: Normal  Ectopy: None  Conduction: Normal, prior short AZ, inverted P wave/AZ depression resolved. Current AZ .14.  ST Segments/ T Waves: No ST-T wave changes, No acute ischemic changes and T wave inversion V2  Q Waves: None  Comparison to prior: Prior evening short AZ, AZ depression resolved.    Clinical Impression: normal EKG    Impression:  Middle aged female presents with brief syncopal event while standing., consistent with vasovagal syncope. This was accompanied by warning symptoms. She had no palpitations or chest pain. She had abnormal EKG at the time of the syncopal event with short AZ interval and slow upslope of the R wave. She had significant hypokalemia at the time of the event. Today, after potassium replacement the EKG changes have fully resolved. The EKGs were both reviewed with the EP  service who feel short VT syndrome is very unlikely. Presumably the changes were related to hypokalemia. She is on lisinopril/ hydrochlorothiazide for blood pressure with hydrochlorothiazide dose of 12.5 mg. She has a follow up appointment with her primary MD in 1 week. She should have potassium rechecked at her primary clinic. If it is again low, she may need to have the hydrochlorothiazide reduced or eliminated. She had no tele events overnight. Resting echocardiogram today is normal.     Plan:  OK to discharge.  48 hour Ziopatch at discharge.  Follow up with primary MD in 1 week.    Jose Vincent MD

## 2019-12-03 NOTE — ED NOTES
General acute hospital, [place]   ED Nurse to Floor Handoff     Joycelyn Chan is a 74 year old female who speaks English and lives with a spouse,  in a home  They arrived in the ED by ambulance from [facility].     ED Chief Complaint: Loss of Consciousness; Fall; and Nausea    ED Dx;   Final diagnoses:   Syncope, unspecified syncope type         Needed?: No    Allergies: No Known Allergies.  Past Medical Hx:   Past Medical History:   Diagnosis Date     GERD (gastroesophageal reflux disease)      Hypercholesteremia      Hypertension      Thyroid disease       Baseline Mental status: WDL  Current Mental Status changes: at basesline    Infection present or suspected this encounter: no  Sepsis suspected: No  Isolation type: No active isolations     Activity level - Baseline/Home:  Independent  Activity Level - Current:   Stand with Assist    Bariatric equipment needed?: No    In the ED these meds were given:   Medications   0.9% sodium chloride BOLUS (1,000 mLs Intravenous New Bag 12/2/19 2100)   ondansetron (ZOFRAN) injection 4 mg (4 mg Intravenous Given 12/2/19 2049)   potassium chloride ER (K-DUR/KLOR-CON M) CR tablet 40 mEq (40 mEq Oral Given 12/2/19 2154)       Drips running?  Yes    Home pump  No    Current LDAs  Peripheral IV 12/02/19 Left Upper forearm (Active)   Number of days: 0       Peripheral IV 12/02/19 Right Upper forearm (Active)   Site Assessment WDL 12/2/2019  8:41 PM   Line Status Saline locked 12/2/2019  8:41 PM   Phlebitis Scale 0-->no symptoms 12/2/2019  8:41 PM   Infiltration Scale 0 12/2/2019  8:41 PM   Infiltration Site Treatment Method  None 12/2/2019  8:41 PM   Extravasation? No 12/2/2019  8:41 PM   Number of days: 0       Labs results:   Labs Ordered and Resulted from Time of ED Arrival Up to the Time of Departure from the ED   BASIC METABOLIC PANEL - Abnormal; Notable for the following components:       Result Value    Sodium 132 (*)     Potassium 2.8 (*)     Glucose 121  "(*)     Creatinine 1.06 (*)     GFR Estimate 52 (*)     All other components within normal limits   CBC WITH PLATELETS DIFFERENTIAL   TROPONIN I   NT PROBNP INPATIENT   ALCOHOL ETHYL   TROPONIN POCT       Imaging Studies:   Recent Results (from the past 24 hour(s))   XR Chest Port 1 View    Narrative    EXAM: XR CHEST PORT 1 VW  LOCATION: [facility]  DATE/TIME: 12/2/2019 8:41 PM    INDICATION: Syncopal episode  COMPARISON: None.      Impression    IMPRESSION: Blunting of left costophrenic angle. Focal atelectasis left base. Right lung clear. Heart size normal       Recent vital signs:   BP (!) 144/94   Pulse 67   Temp 97.4  F (36.3  C) (Oral)   Resp 10   Ht 1.626 m (5' 4\")   Wt 70.4 kg (155 lb 4.8 oz)   SpO2 97%   BMI 26.66 kg/m      Samantha Coma Scale Score: 15 (12/02/19 2038)       Cardiac Rhythm: Normal Sinus  Pt needs tele? Yes  Skin/wound Issues: None    Code Status: Full Code    Pain control: good    Nausea control: good    Abnormal labs/tests/findings requiring intervention: see epic    Family present during ED course? Yes   Family Comments/Social Situation comments: n/A    Tasks needing completion: None    Ayaka Fontenot, RN    [phone] [facility] ED      "

## 2019-12-04 LAB — INTERPRETATION ECG - MUSE: NORMAL

## 2019-12-27 ENCOUNTER — TELEPHONE (OUTPATIENT)
Dept: CARDIOLOGY | Facility: CLINIC | Age: 74
End: 2019-12-27

## 2019-12-27 NOTE — TELEPHONE ENCOUNTER
Faxed Zio patch report to Dr. Gill [facility] at Fax: [phone] and emailed to patient's email [email or web address]

## 2019-12-27 NOTE — TELEPHONE ENCOUNTER
Health Call Center    Phone Message    May a detailed message be left on voicemail: yes    Reason for Call: Order(s): Other:   Reason for requested:Zio Patch/ Holter Monitor 12/3 for 72 hours until 12/6/19.  Results sent to Mark Anthony Villegas MD   Date needed: 12/27/19  Provider name: Mark Anthony Villegas MD     Comments: Joe Hirsch would like the results:  [phone] and send over to PCP: Dr. Vesley [facility] [phone]        Action Taken: Message routed to:  [facility] ([facility]): Cardiology

## 2020-03-15 ENCOUNTER — HEALTH MAINTENANCE LETTER (OUTPATIENT)
Age: 75
End: 2020-03-15

## 2021-01-10 ENCOUNTER — HEALTH MAINTENANCE LETTER (OUTPATIENT)
Age: 76
End: 2021-01-10

## 2021-05-08 ENCOUNTER — HEALTH MAINTENANCE LETTER (OUTPATIENT)
Age: 76
End: 2021-05-08

## 2021-10-23 ENCOUNTER — HEALTH MAINTENANCE LETTER (OUTPATIENT)
Age: 76
End: 2021-10-23

## 2021-12-18 ENCOUNTER — HEALTH MAINTENANCE LETTER (OUTPATIENT)
Age: 76
End: 2021-12-18

## 2022-01-30 NOTE — ED TRIAGE NOTES
Syncopal event PTA at [facility]. Pt had a few alcoholic beverages with the plans to go to a game after. She states she wasn't feeling well when she initially arrived to [facility] and states after a point she got up and felt nauseated and passed out to the ground, possibly hitting her head.  EMS states was WNL and pt's only complaints are nausea. Denies chest pain or abd pain. Pt has been having L arm pain/scapular starting about 1.5 months ago. Pt denies hx of syncopal events.   no chest pain and no edema.

## 2022-06-04 ENCOUNTER — HEALTH MAINTENANCE LETTER (OUTPATIENT)
Age: 77
End: 2022-06-04

## 2022-10-09 ENCOUNTER — HEALTH MAINTENANCE LETTER (OUTPATIENT)
Age: 77
End: 2022-10-09

## 2023-06-10 ENCOUNTER — HEALTH MAINTENANCE LETTER (OUTPATIENT)
Age: 78
End: 2023-06-10